# Patient Record
Sex: MALE | Race: WHITE | NOT HISPANIC OR LATINO | Employment: FULL TIME | URBAN - METROPOLITAN AREA
[De-identification: names, ages, dates, MRNs, and addresses within clinical notes are randomized per-mention and may not be internally consistent; named-entity substitution may affect disease eponyms.]

---

## 2017-02-04 ENCOUNTER — APPOINTMENT (OUTPATIENT)
Dept: LAB | Age: 27
End: 2017-02-04
Attending: PREVENTIVE MEDICINE
Payer: COMMERCIAL

## 2017-02-04 ENCOUNTER — OFFICE VISIT (OUTPATIENT)
Dept: LAB | Age: 27
End: 2017-02-04
Attending: PREVENTIVE MEDICINE
Payer: COMMERCIAL

## 2017-02-04 ENCOUNTER — TRANSCRIBE ORDERS (OUTPATIENT)
Dept: ADMINISTRATIVE | Age: 27
End: 2017-02-04

## 2017-02-04 DIAGNOSIS — Z00.00 ROUTINE GENERAL MEDICAL EXAMINATION AT A HEALTH CARE FACILITY: Primary | ICD-10-CM

## 2017-02-04 DIAGNOSIS — Z00.00 ROUTINE GENERAL MEDICAL EXAMINATION AT A HEALTH CARE FACILITY: ICD-10-CM

## 2017-02-04 LAB
ALBUMIN SERPL BCP-MCNC: 4 G/DL (ref 3.5–5)
ALP SERPL-CCNC: 75 U/L (ref 46–116)
ALT SERPL W P-5'-P-CCNC: 21 U/L (ref 12–78)
ANION GAP SERPL CALCULATED.3IONS-SCNC: 5 MMOL/L (ref 4–13)
AST SERPL W P-5'-P-CCNC: 8 U/L (ref 5–45)
BASOPHILS # BLD AUTO: 0.01 THOUSANDS/ΜL (ref 0–0.1)
BASOPHILS NFR BLD AUTO: 0 % (ref 0–1)
BILIRUB SERPL-MCNC: 0.93 MG/DL (ref 0.2–1)
BUN SERPL-MCNC: 13 MG/DL (ref 5–25)
CALCIUM SERPL-MCNC: 9.3 MG/DL (ref 8.3–10.1)
CHLORIDE SERPL-SCNC: 103 MMOL/L (ref 100–108)
CHOLEST SERPL-MCNC: 168 MG/DL (ref 50–200)
CO2 SERPL-SCNC: 31 MMOL/L (ref 21–32)
CREAT SERPL-MCNC: 0.86 MG/DL (ref 0.6–1.3)
EOSINOPHIL # BLD AUTO: 0.06 THOUSAND/ΜL (ref 0–0.61)
EOSINOPHIL NFR BLD AUTO: 1 % (ref 0–6)
ERYTHROCYTE [DISTWIDTH] IN BLOOD BY AUTOMATED COUNT: 12.6 % (ref 11.6–15.1)
GFR SERPL CREATININE-BSD FRML MDRD: >60 ML/MIN/1.73SQ M
GLUCOSE SERPL-MCNC: 94 MG/DL (ref 65–140)
HCT VFR BLD AUTO: 42.2 % (ref 36.5–49.3)
HDLC SERPL-MCNC: 53 MG/DL (ref 40–60)
HGB BLD-MCNC: 14.4 G/DL (ref 12–17)
LDLC SERPL CALC-MCNC: 100 MG/DL (ref 0–100)
LYMPHOCYTES # BLD AUTO: 1.39 THOUSANDS/ΜL (ref 0.6–4.47)
LYMPHOCYTES NFR BLD AUTO: 28 % (ref 14–44)
MCH RBC QN AUTO: 29.9 PG (ref 26.8–34.3)
MCHC RBC AUTO-ENTMCNC: 34.1 G/DL (ref 31.4–37.4)
MCV RBC AUTO: 88 FL (ref 82–98)
MONOCYTES # BLD AUTO: 0.4 THOUSAND/ΜL (ref 0.17–1.22)
MONOCYTES NFR BLD AUTO: 8 % (ref 4–12)
NEUTROPHILS # BLD AUTO: 3.04 THOUSANDS/ΜL (ref 1.85–7.62)
NEUTS SEG NFR BLD AUTO: 63 % (ref 43–75)
NRBC BLD AUTO-RTO: 0 /100 WBCS
PLATELET # BLD AUTO: 239 THOUSANDS/UL (ref 149–390)
PMV BLD AUTO: 10.9 FL (ref 8.9–12.7)
POTASSIUM SERPL-SCNC: 4.7 MMOL/L (ref 3.5–5.3)
PROT SERPL-MCNC: 8.1 G/DL (ref 6.4–8.2)
RBC # BLD AUTO: 4.82 MILLION/UL (ref 3.88–5.62)
SODIUM SERPL-SCNC: 139 MMOL/L (ref 136–145)
TRIGL SERPL-MCNC: 75 MG/DL
WBC # BLD AUTO: 4.91 THOUSAND/UL (ref 4.31–10.16)

## 2017-02-04 PROCEDURE — 36415 COLL VENOUS BLD VENIPUNCTURE: CPT

## 2017-02-04 PROCEDURE — 85025 COMPLETE CBC W/AUTO DIFF WBC: CPT

## 2017-02-04 PROCEDURE — 80061 LIPID PANEL: CPT

## 2017-02-04 PROCEDURE — 80053 COMPREHEN METABOLIC PANEL: CPT

## 2017-02-04 PROCEDURE — 93005 ELECTROCARDIOGRAM TRACING: CPT

## 2017-02-06 LAB
ATRIAL RATE: 47 BPM
P AXIS: 14 DEGREES
PR INTERVAL: 124 MS
QRS AXIS: 47 DEGREES
QRSD INTERVAL: 98 MS
QT INTERVAL: 430 MS
QTC INTERVAL: 380 MS
T WAVE AXIS: 52 DEGREES
VENTRICULAR RATE: 47 BPM

## 2018-01-15 NOTE — MISCELLANEOUS
Message  left message for patient to call for appointment      Active Problems    1  Abdominal pain (789 00) (R10 9)   2  Diarrhea (787 91) (R19 7)   3  Obesity (278 00) (E66 9)   4  Obesity surgery status (V45 86) (Z98 84)   5  Postgastrectomy malabsorption (579 3) (K91 2,Z90 3)   6  Pre-operative cardiovascular examination (V72 81) (Z01 810)    Current Meds   1  Calcium Citrate TABS; 2000 mg daily; Therapy: (Recorded:[id number]) to Recorded   2  [facility] Omeprazole 20 MG Oral Tablet Delayed Release; Take one daily; Therapy: [id number] to (Last KL:[id number])  Requested for: [id number] Ordered   3  Omeprazole 20 MG Oral Tablet Delayed Release; Therapy: (Recorded:30Jan2014) to Recorded   4  One-Daily Multi Vitamins TABS; Therapy: (Recorded:21Oct2013) to Recorded   5  Vitamin D TABS; 4000 iu daily; Therapy: (Recorded:22Jvi6080) to Recorded    Allergies    1   No Known Drug Allergies    Signatures   Electronically signed by : Ananya Edmonds, ; Jun 17 2016  2:30PM EST                       (Author)

## 2019-03-16 ENCOUNTER — APPOINTMENT (OUTPATIENT)
Dept: RADIOLOGY | Facility: MEDICAL CENTER | Age: 29
End: 2019-03-16
Attending: PHYSICIAN ASSISTANT

## 2019-03-16 ENCOUNTER — TRANSCRIBE ORDERS (OUTPATIENT)
Dept: URGENT CARE | Facility: MEDICAL CENTER | Age: 29
End: 2019-03-16
Payer: COMMERCIAL

## 2019-03-16 ENCOUNTER — APPOINTMENT (OUTPATIENT)
Dept: URGENT CARE | Facility: MEDICAL CENTER | Age: 29
End: 2019-03-16
Attending: PHYSICIAN ASSISTANT

## 2019-03-16 DIAGNOSIS — Z02.1 PRE-EMPLOYMENT HEALTH SCREENING EXAMINATION: Primary | ICD-10-CM

## 2019-03-16 DIAGNOSIS — Z02.1 PRE-EMPLOYMENT HEALTH SCREENING EXAMINATION: ICD-10-CM

## 2019-03-16 LAB
ALBUMIN SERPL BCP-MCNC: 4 G/DL (ref 3.5–5)
ALP SERPL-CCNC: 63 U/L (ref 46–116)
ALT SERPL W P-5'-P-CCNC: 23 U/L (ref 12–78)
ANION GAP SERPL CALCULATED.3IONS-SCNC: 4 MMOL/L (ref 4–13)
AST SERPL W P-5'-P-CCNC: 16 U/L (ref 5–45)
ATRIAL RATE: 50 BPM
BASOPHILS # BLD AUTO: 0.02 THOUSANDS/ΜL (ref 0–0.1)
BASOPHILS NFR BLD AUTO: 0 % (ref 0–1)
BILIRUB SERPL-MCNC: 0.82 MG/DL (ref 0.2–1)
BUN SERPL-MCNC: 13 MG/DL (ref 5–25)
CALCIUM SERPL-MCNC: 9 MG/DL (ref 8.3–10.1)
CHLORIDE SERPL-SCNC: 103 MMOL/L (ref 100–108)
CHOLEST SERPL-MCNC: 212 MG/DL (ref 50–200)
CO2 SERPL-SCNC: 29 MMOL/L (ref 21–32)
CREAT SERPL-MCNC: 0.86 MG/DL (ref 0.6–1.3)
EOSINOPHIL # BLD AUTO: 0.04 THOUSAND/ΜL (ref 0–0.61)
EOSINOPHIL NFR BLD AUTO: 1 % (ref 0–6)
ERYTHROCYTE [DISTWIDTH] IN BLOOD BY AUTOMATED COUNT: 12.7 % (ref 11.6–15.1)
GFR SERPL CREATININE-BSD FRML MDRD: 116 ML/MIN/1.73SQ M
GLUCOSE P FAST SERPL-MCNC: 88 MG/DL (ref 65–99)
HCT VFR BLD AUTO: 44.9 % (ref 36.5–49.3)
HDLC SERPL-MCNC: 50 MG/DL (ref 40–60)
HGB BLD-MCNC: 14.8 G/DL (ref 12–17)
IMM GRANULOCYTES # BLD AUTO: 0.01 THOUSAND/UL (ref 0–0.2)
IMM GRANULOCYTES NFR BLD AUTO: 0 % (ref 0–2)
LDLC SERPL CALC-MCNC: 142 MG/DL (ref 0–100)
LYMPHOCYTES # BLD AUTO: 1.53 THOUSANDS/ΜL (ref 0.6–4.47)
LYMPHOCYTES NFR BLD AUTO: 31 % (ref 14–44)
MCH RBC QN AUTO: 30 PG (ref 26.8–34.3)
MCHC RBC AUTO-ENTMCNC: 33 G/DL (ref 31.4–37.4)
MCV RBC AUTO: 91 FL (ref 82–98)
MONOCYTES # BLD AUTO: 0.42 THOUSAND/ΜL (ref 0.17–1.22)
MONOCYTES NFR BLD AUTO: 8 % (ref 4–12)
NEUTROPHILS # BLD AUTO: 3 THOUSANDS/ΜL (ref 1.85–7.62)
NEUTS SEG NFR BLD AUTO: 60 % (ref 43–75)
NONHDLC SERPL-MCNC: 162 MG/DL
NRBC BLD AUTO-RTO: 0 /100 WBCS
P AXIS: 41 DEGREES
PLATELET # BLD AUTO: 242 THOUSANDS/UL (ref 149–390)
PMV BLD AUTO: 10.9 FL (ref 8.9–12.7)
POTASSIUM SERPL-SCNC: 3.9 MMOL/L (ref 3.5–5.3)
PR INTERVAL: 134 MS
PROT SERPL-MCNC: 8 G/DL (ref 6.4–8.2)
QRS AXIS: 43 DEGREES
QRSD INTERVAL: 92 MS
QT INTERVAL: 422 MS
QTC INTERVAL: 384 MS
RBC # BLD AUTO: 4.94 MILLION/UL (ref 3.88–5.62)
SODIUM SERPL-SCNC: 136 MMOL/L (ref 136–145)
T WAVE AXIS: 56 DEGREES
TRIGL SERPL-MCNC: 99 MG/DL
VENTRICULAR RATE: 50 BPM
WBC # BLD AUTO: 5.02 THOUSAND/UL (ref 4.31–10.16)

## 2019-03-16 PROCEDURE — 80061 LIPID PANEL: CPT

## 2019-03-16 PROCEDURE — 80053 COMPREHEN METABOLIC PANEL: CPT

## 2019-03-16 PROCEDURE — 93010 ELECTROCARDIOGRAM REPORT: CPT

## 2019-03-16 PROCEDURE — 85025 COMPLETE CBC W/AUTO DIFF WBC: CPT | Performed by: PHYSICIAN ASSISTANT

## 2019-03-16 PROCEDURE — 71046 X-RAY EXAM CHEST 2 VIEWS: CPT

## 2021-06-29 ENCOUNTER — OFFICE VISIT (OUTPATIENT)
Dept: INTERNAL MEDICINE CLINIC | Facility: CLINIC | Age: 31
End: 2021-06-29
Payer: COMMERCIAL

## 2021-06-29 ENCOUNTER — HOSPITAL ENCOUNTER (OUTPATIENT)
Dept: VASCULAR ULTRASOUND | Facility: HOSPITAL | Age: 31
Discharge: HOME | End: 2021-06-29
Payer: COMMERCIAL

## 2021-06-29 VITALS
SYSTOLIC BLOOD PRESSURE: 110 MMHG | DIASTOLIC BLOOD PRESSURE: 60 MMHG | HEIGHT: 74 IN | WEIGHT: 269 LBS | HEART RATE: 69 BPM | TEMPERATURE: 97.6 F | OXYGEN SATURATION: 99 % | BODY MASS INDEX: 34.52 KG/M2

## 2021-06-29 DIAGNOSIS — M54.31 SCIATICA, RIGHT SIDE: Primary | ICD-10-CM

## 2021-06-29 DIAGNOSIS — M79.651 PAIN IN RIGHT THIGH: ICD-10-CM

## 2021-06-29 PROCEDURE — 99204 OFFICE O/P NEW MOD 45 MIN: CPT | Performed by: INTERNAL MEDICINE

## 2021-06-29 PROCEDURE — 93971 EXTREMITY STUDY: CPT

## 2021-06-29 PROCEDURE — 3725F SCREEN DEPRESSION PERFORMED: CPT | Performed by: INTERNAL MEDICINE

## 2021-06-29 PROCEDURE — 93971 EXTREMITY STUDY: CPT | Performed by: SURGERY

## 2021-06-29 PROCEDURE — 1036F TOBACCO NON-USER: CPT | Performed by: INTERNAL MEDICINE

## 2021-06-29 RX ORDER — DIPHENOXYLATE HYDROCHLORIDE AND ATROPINE SULFATE 2.5; .025 MG/1; MG/1
1 TABLET ORAL DAILY
COMMUNITY

## 2021-06-29 RX ORDER — METHYLPREDNISOLONE 4 MG/1
TABLET ORAL
Qty: 21 EACH | Refills: 0 | Status: SHIPPED | OUTPATIENT
Start: 2021-06-29

## 2021-06-29 RX ORDER — VITAMIN B COMPLEX
TABLET ORAL
COMMUNITY

## 2021-06-29 RX ORDER — CYCLOBENZAPRINE HCL 10 MG
10 TABLET ORAL 3 TIMES DAILY PRN
Qty: 30 TABLET | Refills: 0 | Status: SHIPPED | OUTPATIENT
Start: 2021-06-29

## 2021-06-29 NOTE — ASSESSMENT & PLAN NOTE
Will start oral steroids to take down inflammation and muscle relaxant  Also refer patient to physical therapy  If patient not improving, will consider imaging

## 2021-06-29 NOTE — PROGRESS NOTES
Assessment/Plan:    Sciatica, right side   Will start oral steroids to take down inflammation and muscle relaxant  Also refer patient to physical therapy  If patient not improving, will consider imaging  Diagnoses and all orders for this visit:    Sciatica, right side  -     Ambulatory referral to Physical Therapy; Future  -     methylPREDNISolone 4 MG tablet therapy pack; Use as directed on package  -     cyclobenzaprine (FLEXERIL) 10 mg tablet; Take 1 tablet (10 mg total) by mouth 3 (three) times a day as needed for muscle spasms    Other orders  -     multivitamin (THERAGRAN) TABS; Take 1 tablet by mouth daily  -     OMEPRAZOLE PO; Take by mouth  -     cholecalciferol (VITAMIN D3) 25 mcg (1,000 units) tablet; Take by mouth        BMI Counseling: Body mass index is 34 54 kg/m²  The BMI is above normal  Nutrition recommendations include encouraging healthy choices of fruits and vegetables and moderation in carbohydrate intake  Exercise recommendations include exercising 3-5 times per week  Subjective:      Patient ID: Gena Leal is a 26 y o  male  Pt started with a muscle cramp in the back of his leg on the right  Pain starts in buttock and radiates down to below knee  No Injury in the area, no rash, no fevers, no chills, no leakage of bladder or bowel, no change in bowels, no saddle anesthesia  No history of this pain  Pain is worse with movement, 4/10 at rest, and 8 or 9/10 with movement  Pt has been using tylenol      The following portions of the patient's history were reviewed and updated as appropriate: allergies, current medications, past family history, past medical history, past social history, past surgical history and problem list     Review of Systems   Constitutional: Negative for chills, fatigue and fever  HENT: Negative for congestion, nosebleeds, postnasal drip, sore throat and trouble swallowing  Eyes: Negative for pain     Respiratory: Negative for cough, chest tightness, shortness of breath and wheezing  Cardiovascular: Negative for chest pain, palpitations and leg swelling  Gastrointestinal: Negative for abdominal pain, constipation, diarrhea, nausea and vomiting  Endocrine: Negative for polydipsia and polyuria  Genitourinary: Negative for dysuria, flank pain and hematuria  Musculoskeletal: Positive for back pain  Negative for arthralgias  Skin: Negative for rash  Neurological: Negative for dizziness, tremors and headaches  Hematological: Does not bruise/bleed easily  Psychiatric/Behavioral: Negative for confusion and dysphoric mood  The patient is not nervous/anxious  Objective:      /60   Pulse 69   Temp 97 6 °F (36 4 °C) (Temporal)   Ht 6' 2" (1 88 m)   Wt 122 kg (269 lb)   SpO2 99%   BMI 34 54 kg/m²          Physical Exam  Vitals reviewed  Constitutional:       General: He is not in acute distress  Appearance: Normal appearance  He is well-developed  HENT:      Head: Normocephalic and atraumatic  Right Ear: External ear normal       Left Ear: External ear normal    Eyes:      General: No scleral icterus  Conjunctiva/sclera: Conjunctivae normal    Neck:      Thyroid: No thyromegaly  Trachea: No tracheal deviation  Cardiovascular:      Rate and Rhythm: Normal rate and regular rhythm  Heart sounds: Normal heart sounds  Pulmonary:      Effort: Pulmonary effort is normal  No respiratory distress  Breath sounds: Normal breath sounds  No wheezing or rales  Abdominal:      General: Bowel sounds are normal       Palpations: Abdomen is soft  Tenderness: There is no abdominal tenderness  There is no guarding or rebound  Musculoskeletal:      Cervical back: Normal range of motion and neck supple  Right lower leg: No edema  Left lower leg: No edema        Comments:  Patient unable to do straight leg raise test on right due to pain   Lymphadenopathy:      Cervical: No cervical adenopathy  Skin:     Coloration: Skin is not jaundiced or pale  Neurological:      Mental Status: He is alert and oriented to person, place, and time  Psychiatric:         Behavior: Behavior normal          Thought Content:  Thought content normal          Judgment: Judgment normal

## 2021-06-29 NOTE — PATIENT INSTRUCTIONS
Problem List Items Addressed This Visit        Nervous and Auditory    Sciatica, right side - Primary      Will start oral steroids to take down inflammation and muscle relaxant  Also refer patient to physical therapy  If patient not improving, will consider imaging           Relevant Medications    methylPREDNISolone 4 MG tablet therapy pack    cyclobenzaprine (FLEXERIL) 10 mg tablet    Other Relevant Orders    Ambulatory referral to Physical Therapy

## 2021-07-01 ENCOUNTER — EVALUATION (OUTPATIENT)
Dept: PHYSICAL THERAPY | Facility: MEDICAL CENTER | Age: 31
End: 2021-07-01
Payer: COMMERCIAL

## 2021-07-01 DIAGNOSIS — M54.31 SCIATICA, RIGHT SIDE: Primary | ICD-10-CM

## 2021-07-01 PROCEDURE — 97161 PT EVAL LOW COMPLEX 20 MIN: CPT

## 2021-07-01 NOTE — PROGRESS NOTES
PT Evaluation     Today's date: 2021  Patient name: Favian Johnson  : 1990  MRN: [id number]  Referring provider: Tari Villalobos MD  Dx:   Encounter Diagnosis     ICD-10-CM    1  Sciatica, right side  M54 31 Ambulatory referral to Physical Therapy       Start Time: 81  Stop Time: 3884  Total time in clinic (min): 40 minutes    Assessment  Assessment details: Vin Montalvo is a 25 y/o male who presents to OP PT with a a referral from Dr Tahmina Aguilar with a medical diagnosis of sciatica, right side Upon examination pt presents with decreased lumbar ROM, decreased B LE strength, decreased functional mobility, decreased muscular endurance, and increased pain  Previously mentioned deficits have impaired patients ability to perform ADLs, recreational activities and house hold duties  Pt was educated on examination findings, diagnosis, prognosis, home exercise program to complete  Pt states understanding and consents to skilled PT services  Pt is a good candidate for skilled PT services  Positive prognositic indicators include desire to improve, active lifestyle and directional biased low back pain     Negative prognostic indicators include chronicity of issue  Pt would benefit from skilled PT services to address functional deficits to return to PLOF  Impairments: abnormal gait, abnormal muscle firing, abnormal muscle tone, abnormal or restricted ROM, abnormal movement, activity intolerance, impaired physical strength, lacks appropriate home exercise program and pain with function    Symptom irritability: moderateUnderstanding of Dx/Px/POC: good   Prognosis: good    Goals  STG    1  Patient will demonstrate increased hip abduction strength to 4+/5 to improve gait mechanics in midstance and pre-swing stages    2  Patient will be able to perform deep squat and return to standing without pain to fulfill requirements as     3      Pt will report a 2-3 point decrease on NPRS for improved tolerance to ADLs, recreational activities and work duties         LTG    1  Patient will be independent with HEP for continued progress  2      Patient will demonstrate 5/5 in all hip  MMT to improve tolerance for performing work and recreational activities and allow for return to prolonged community ambulation    3  Patient will attain FOTO score of 73 or greater at time of discharge  4      Patient will tolerate driving greater than 3 hours for improved tolerance to work duties  5     Patient will demonstrate minimal lumbar ROM restriction  6   Pt will return to PLOF in all recreational activities, work duties, and ADLs  Plan  Patient would benefit from: skilled physical therapy  Referral necessary: No  Planned modality interventions: cryotherapy and TENS  Planned therapy interventions: joint mobilization, manual therapy, massage, ADL training, balance, neuromuscular re-education, patient education, strengthening, stretching, therapeutic activities, therapeutic exercise, flexibility, functional ROM exercises, gait training, graded exercise and home exercise program  Frequency: 2x week  Plan of Care beginning date: 7/1/2021  Plan of Care expiration date: 8/26/2021  Treatment plan discussed with: patient        Subjective Evaluation    History of Present Illness  Mechanism of injury: Subjective: Patient states he bent over to while working on his tractor and felt a sharp pain  He states initially behind his knee and since this, five days have passed, he states he continues to have sharp stabbing pain radiating from his knee to his butt  Patient states he is a side sleeper normally, and has not been able to sleep  Patient states he is a  and has not been able to drive because of the pain  Patient states he has been given an oral steroid and muscle realxant that have improved his symptoms     Pain  Location: low back  Type: stabbing/shooting  Current: 6/10  Best: 5/10  Worst: 9/10  Alleviates: medication-slightly  Aggravates: all movement    Occupation: truck drive    Imaging:     PMH: n/a    Previous Surgeries: gastric bypass    Previous Physical Activity: 30 minutes of activity per day    Current Medications: n/a    RAEANN: flexion  Surgery Date: n/a     MD: Dr Tamar Sam    Patient Goals: be out of pain, return to work               Objective     Palpation   Left   Hypertonic in the erector spinae, lumbar paraspinals and quadratus lumborum  Muscle spasm in the erector spinae  Right   Hypertonic in the erector spinae, lumbar paraspinals and quadratus lumborum  Tenderness     Left Hip   Tenderness in the PSIS  Right Hip   Tenderness in the PSIS  Active Range of Motion     Lumbar   Flexion:  with pain Restriction level: maximal  Extension:  Restriction level: minimal  Left lateral flexion:  Restriction level: maximal  Right lateral flexion:  Restriction level: maximal  Left rotation:  Restriction level: maximal  Right rotation:  Restriction level: maximal    Joint Play     Hypomobile: L1, L2, L3, L4, L5 and S1     Pain: L1, L2, L3, L4, L5 and S1   Mechanical Assessment    Cervical      Thoracic      Lumbar    Standing extension: repeated movements  Pain intensity: worse  Pain level: produced  Lying extension: sustained positions  Pain location: centralized  Pain intensity: better  Pain level: decreased    Strength/Myotome Testing     Left Hip   Planes of Motion   Flexion: 2+  Extension: 2+  Abduction: 2+  External rotation: 2+  Internal rotation: 2+    Right Hip   Planes of Motion   Flexion: 4+  Extension: 4+  Abduction: 4+  External rotation: 3+  Internal rotation: 3+    Left Knee   Flexion: 3-  Extension: 3-    Right Knee   Flexion: 5  Extension: 5    Functional Assessment      Squat    Trunk lean left and sitting toward left side         Flowsheet Rows      Most Recent Value   PT/OT G-Codes   Current Score  33   Projected Score  73             Precautions: universal      Manuals 7/1            Jt Mob-Pa mob             STM                                       Neuro Re-Ed             TAB             TAB ADD             TAB ER                                                                 Ther Ex             Supine HSS             Prone lying  3' 3x            Prone Prop unable            Prone Press Up                                                                 Ther Activity                                       Gait Training                                       Modalities

## 2021-07-06 ENCOUNTER — OFFICE VISIT (OUTPATIENT)
Dept: PHYSICAL THERAPY | Facility: MEDICAL CENTER | Age: 31
End: 2021-07-06
Payer: COMMERCIAL

## 2021-07-06 DIAGNOSIS — M54.31 SCIATICA, RIGHT SIDE: Primary | ICD-10-CM

## 2021-07-06 PROCEDURE — 97110 THERAPEUTIC EXERCISES: CPT

## 2021-07-06 PROCEDURE — 97112 NEUROMUSCULAR REEDUCATION: CPT

## 2021-07-06 PROCEDURE — 97140 MANUAL THERAPY 1/> REGIONS: CPT

## 2021-07-06 NOTE — PROGRESS NOTES
Daily Note     Today's date: 2021  Patient name: Harrison Griffith  : 1990  MRN: [id number]  Referring provider: Sariah Bhatia MD  Dx:   Encounter Diagnosis     ICD-10-CM    1  Sciatica, right side  M54 31        Start Time: 688  Stop Time:   Total time in clinic (min): 38 minutes    Subjective: Pt currently rates pain as a 5/10  He states he finished his steroid over the weekend  He has noticed an improvement in symptoms with prone lying  Currently pain is below his Right glute  Objective: See treatment diary below      Assessment: Tolerated treatment well  Post prone press ups, patient states pain has moved to just above right glute  Patient with palpable trigger points during soft tissue mobilization, with ischemic compression, moderate resolution  Patient denies radicular symptoms at end of session  Rates low back pain overall as a 4/10  Patient demonstrated fatigue post treatment and would benefit from continued PT      Plan: Continue per plan of care  Progress treatment as tolerated         Precautions: universal      Manuals            Jt Mob-Pa mob             STM  L/S R,                                       Neuro Re-Ed             TAB  5" 20x           TAB ADD  5" 15x           TAB ER  5" 20x BTB                                                               Ther Ex             Supine HSS             Prone lying  3' 3x            Prone Prop unable 3'            Prone Press Up  20x 5"           Prone Hip ext  10x           LTR  5" 10xea                                     Ther Activity                                       Gait Training                                       Modalities

## 2021-07-08 ENCOUNTER — OFFICE VISIT (OUTPATIENT)
Dept: PHYSICAL THERAPY | Facility: MEDICAL CENTER | Age: 31
End: 2021-07-08
Payer: COMMERCIAL

## 2021-07-08 DIAGNOSIS — M54.31 SCIATICA, RIGHT SIDE: Primary | ICD-10-CM

## 2021-07-08 PROCEDURE — 97140 MANUAL THERAPY 1/> REGIONS: CPT

## 2021-07-08 PROCEDURE — 97110 THERAPEUTIC EXERCISES: CPT

## 2021-07-08 NOTE — PROGRESS NOTES
Daily Note     Today's date: 2021  Patient name: Harrison Griffith  : 1990  MRN: [id number]  Referring provider: Sariah Bhatia MD  Dx:   Encounter Diagnosis     ICD-10-CM    1  Sciatica, right side  M54 31                   Subjective: Patient rates pain as a 6/10, and currently radicular symptoms are "mid glute " He states he feels stiff in the morning but notices slight improvement when moving around at work  Objective: See treatment diary below      Assessment: Tolerated treatment well  Post press up with over pressure patient rates pain as a 3/10 in the low back as well as his glute  He does deny further centralization  Patient educated to continue HEP at this time  Pt states understanding and agrees  Patient demonstrated fatigue post treatment and would benefit from continued PT      Plan: Continue per plan of care  Progress treatment as tolerated         Precautions: universal    PT 1:1 [id number]  Manuals           Jt Mob-Pa mob             STM  L/S R,   L/S R                                    Neuro Re-Ed             TAB  5" 20x 5" 20x          TAB ADD  5" 15x 5" 15x          TAB ER  5" 20x BTB 5" 20x BTB                                                              Ther Ex             Supine HSS             Prone lying  3' 3x            Prone Prop unable 3'            Prone Press Up  20x 5" 20x 5" w/ OP          Prone Hip ext  10x           LTR  5" 10xea 5" 10xea          Prone on elbows   3'          Supine HSS   30" 3x          Piriformis Stretch   30" 3x                       Ther Activity                                       Gait Training                                       Modalities

## 2021-07-13 ENCOUNTER — OFFICE VISIT (OUTPATIENT)
Dept: PHYSICAL THERAPY | Facility: MEDICAL CENTER | Age: 31
End: 2021-07-13
Payer: COMMERCIAL

## 2021-07-13 DIAGNOSIS — M54.31 SCIATICA, RIGHT SIDE: Primary | ICD-10-CM

## 2021-07-13 PROCEDURE — 97110 THERAPEUTIC EXERCISES: CPT

## 2021-07-13 PROCEDURE — 97140 MANUAL THERAPY 1/> REGIONS: CPT

## 2021-07-13 NOTE — PROGRESS NOTES
Daily Note     Today's date: 2021  Patient name: Ike Maravilla  : 1990  MRN: [id number]  Referring provider: Poonam Louis MD  Dx:   Encounter Diagnosis     ICD-10-CM    1  Sciatica, right side  M54 31                   Subjective: Pt states pain at worst is a 5/10  He states radicular symptoms remain "mid glute "      Objective: See treatment diary below      Assessment: Tolerated treatment well  Patient gains relief of symptoms this session from prone   Patient educated to increase frequency of HEP, states understanding  Patient demonstrated fatigue post treatment and would benefit from continued PT      Plan: Continue per plan of care  Progress treatment as tolerated         Precautions: universal      Manuals          Jt Mob-Pa mob             STM  L/S R,   L/S R L/S R                                   Neuro Re-Ed             TAB  5" 20x 5" 20x 5" 20x         TAB ADD  5" 15x 5" 15x 5" 15x         TAB ER  5" 20x BTB 5" 20x BTB 5" 20x BTB                                                             Ther Ex             Supine HSS             Prone lying  3' 3x            Prone Prop unable 3'            Prone Press Up  20x 5" 20x 5" w/ OP 20x 5" w/ OP         Prone Hip ext  10x           LTR  5" 10xea 5" 10xea 5" 10xea         Prone on elbows   3' 2'         Supine HSS   30" 3x 30" 3x         Piriformis Stretch   30" 3x 30" 3x                      Ther Activity                                       Gait Training                                       Modalities

## 2021-07-15 ENCOUNTER — APPOINTMENT (OUTPATIENT)
Dept: PHYSICAL THERAPY | Facility: MEDICAL CENTER | Age: 31
End: 2021-07-15
Payer: COMMERCIAL

## 2021-07-20 ENCOUNTER — OFFICE VISIT (OUTPATIENT)
Dept: PHYSICAL THERAPY | Facility: MEDICAL CENTER | Age: 31
End: 2021-07-20
Payer: COMMERCIAL

## 2021-07-20 DIAGNOSIS — M54.31 SCIATICA, RIGHT SIDE: Primary | ICD-10-CM

## 2021-07-20 PROCEDURE — 97140 MANUAL THERAPY 1/> REGIONS: CPT

## 2021-07-20 PROCEDURE — 97110 THERAPEUTIC EXERCISES: CPT

## 2021-07-20 PROCEDURE — 97112 NEUROMUSCULAR REEDUCATION: CPT

## 2021-07-20 NOTE — PROGRESS NOTES
Daily Note     Today's date: 2021  Patient name: Guy Burgess  : 1990  MRN: [id number]  Referring provider: Maura Ghosh MD  Dx:   Encounter Diagnosis     ICD-10-CM    1  Sciatica, right side  M54 31        Start Time:   Stop Time:   Total time in clinic (min): 40 minutes    Subjective: Patient states significant overall improvement in symptoms  He states currently pain in low back is a 4/10  Denies radicular symptoms  Objective: See treatment diary below      Assessment: Tolerated treatment well  Pain remains the same throughout session for patient, however denies increase in pain with new movement patterns  Patient demonstrated fatigue post treatment and would benefit from continued PT      Plan: Continue per plan of care  Progress treatment as tolerated         Precautions: universal    PT 1:1 -  Manuals         Jt Mob-Pa mob             STM  L/S R,   L/S R L/S R L/S R                                  Neuro Re-Ed             TAB  5" 20x 5" 20x 5" 20x         TAB ADD  5" 15x 5" 15x 5" 15x 5" 15x        TAB ER  5" 20x BTB 5" 20x BTB 5" 20x BTB 5" 20x BTB        Superman Progression     Unilateral progression 5x                                               Ther Ex             Supine HSS             Prone lying  3' 3x            Prone Prop unable 3'            Prone Press Up  20x 5" 20x 5" w/ OP 20x 5" w/ OP 20x        Prone Hip ext  10x   20x        LTR  5" 10xea 5" 10xea 5" 10xea HEP        Prone on elbows   3' 2' HEP        Supine HSS   30" 3x 30" 3x 30" 3x        Piriformis Stretch   30" 3x 30" 3x 30" 3x        Standing Ext w/ OP     20x        Ther Activity                                       Gait Training                                       Modalities

## 2021-07-22 ENCOUNTER — OFFICE VISIT (OUTPATIENT)
Dept: PHYSICAL THERAPY | Facility: MEDICAL CENTER | Age: 31
End: 2021-07-22
Payer: COMMERCIAL

## 2021-07-22 DIAGNOSIS — M54.31 SCIATICA, RIGHT SIDE: Primary | ICD-10-CM

## 2021-07-22 PROCEDURE — 97140 MANUAL THERAPY 1/> REGIONS: CPT

## 2021-07-22 PROCEDURE — 97112 NEUROMUSCULAR REEDUCATION: CPT

## 2021-07-22 PROCEDURE — 97110 THERAPEUTIC EXERCISES: CPT

## 2021-07-22 NOTE — PROGRESS NOTES
Daily Note     Today's date: 2021  Patient name: Harrison Griffith  : 1990  MRN: [id number]  Referring provider: Sariah Bhatia MD  Dx:   Encounter Diagnosis     ICD-10-CM    1  Sciatica, right side  M54 31        Start Time: 7474  Stop Time: 32  Total time in clinic (min): 39 minutes    Subjective: Patient states low back pain remains the same  Rating pain as a 4/10  He states he did experience some tightness the following day after previous visit  Objective: See treatment diary below      Assessment: Tolerated treatment well  Birddog/superman progression held this session due to reported symptoms  Patient was educated in use of tennis ball for self STM for address continued restrictions in lumbar spine  Patient demonstrated fatigue post treatment and would benefit from continued PT      Plan: Continue per plan of care  Progress treatment as tolerated         Precautions: universal      Manuals        Jt Mob-Pa mob             STM  L/S R,   L/S R L/S R L/S R L/S R-pt educated for tennis ball STM of                                  Neuro Re-Ed             TAB  5" 20x 5" 20x 5" 20x         TAB ADD  5" 15x 5" 15x 5" 15x 5" 15x        TAB ER  5" 20x BTB 5" 20x BTB 5" 20x BTB 5" 20x BTB 5" 20x BTB       Superman Progression     Unilateral progression 5x hold       Clamshell      20x                                 Ther Ex             Supine HSS             Prone lying  3' 3x            Prone Prop unable 3'            Prone Press Up  20x 5" 20x 5" w/ OP 20x 5" w/ OP 20x 20x       Prone Hip ext  10x   20x 20x       LTR  5" 10xea 5" 10xea 5" 10xea HEP        Prone on elbows   3' 2' HEP        Supine HSS   30" 3x 30" 3x 30" 3x 30" 3x       Piriformis Stretch   30" 3x 30" 3x 30" 3x 30" 3x       Standing Ext w/ OP     20x 20x       Ther Activity                                       Gait Training                                       Modalities

## 2021-07-27 ENCOUNTER — OFFICE VISIT (OUTPATIENT)
Dept: PHYSICAL THERAPY | Facility: MEDICAL CENTER | Age: 31
End: 2021-07-27
Payer: COMMERCIAL

## 2021-07-27 DIAGNOSIS — M54.31 SCIATICA, RIGHT SIDE: Primary | ICD-10-CM

## 2021-07-27 PROCEDURE — 97110 THERAPEUTIC EXERCISES: CPT

## 2021-07-27 PROCEDURE — 97140 MANUAL THERAPY 1/> REGIONS: CPT

## 2021-07-27 PROCEDURE — 97112 NEUROMUSCULAR REEDUCATION: CPT

## 2021-07-29 ENCOUNTER — OFFICE VISIT (OUTPATIENT)
Dept: PHYSICAL THERAPY | Facility: MEDICAL CENTER | Age: 31
End: 2021-07-29
Payer: COMMERCIAL

## 2021-07-29 DIAGNOSIS — M54.31 SCIATICA, RIGHT SIDE: Primary | ICD-10-CM

## 2021-07-29 PROCEDURE — 97140 MANUAL THERAPY 1/> REGIONS: CPT

## 2021-07-29 PROCEDURE — 97110 THERAPEUTIC EXERCISES: CPT

## 2021-07-29 PROCEDURE — 97112 NEUROMUSCULAR REEDUCATION: CPT

## 2021-07-29 NOTE — PROGRESS NOTES
Daily Note     Today's date: 2021  Patient name: Harrison Griffith  : 1990  MRN: [id number]  Referring provider: Sariah Bhatia MD  Dx:   Encounter Diagnosis     ICD-10-CM    1  Sciatica, right side  M54 31        Start Time: 794  Stop Time:   Total time in clinic (min): 39 minutes    Subjective: Patient states pain is currently a 3/10, but states pain has not increased at all since previous visit  Objective: See treatment diary below      Assessment: Tolerated treatment well  Patient with rating of 2/10 post STM this session  Patient exhibits good biomechanics in performance of box lift today  Patient demonstrated fatigue post treatment and would benefit from continued PT      Plan: Continue per plan of care  Progress treatment as tolerated         Precautions: universal      Manuals      Jt Mob-Pa mob             STM  L/S R,   L/S R L/S R L/S R L/S R-pt educated for tennis ball STM of  L/S R L/S R                               Neuro Re-Ed             TAB  5" 20x 5" 20x 5" 20x         TAB ADD  5" 15x 5" 15x 5" 15x 5" 15x        TAB ER  5" 20x BTB 5" 20x BTB 5" 20x BTB 5" 20x BTB 5" 20x BTB 5" 20x BTB      Superman Progression     Unilateral progression 5x hold       Clamshell      20x 20x 20x     Palloff Press       BTB 20x ea BTB 20x ea     TAB Sled Push       NV Sled + 35# 20' 5x     TAB box Lift       NV 45# 20x     Ther Ex             Prone lying  3' 3x            Prone Prop unable 3'            Prone Press Up  20x 5" 20x 5" w/ OP 20x 5" w/ OP 20x 20x 20x 20x     Prone Hip ext  10x   20x 20x 20x 20x     LTR  5" 10xea 5" 10xea 5" 10xea HEP        Prone on elbows   3' 2' HEP        Supine HSS   30" 3x 30" 3x 30" 3x 30" 3x 30" 3x 30" 3x     Piriformis Stretch   30" 3x 30" 3x 30" 3x 30" 3x 30" 3x 30" 3x     Standing Ext w/ OP     20x 20x 20x 20x     Ther Activity                                       Gait Training Modalities

## 2021-08-03 ENCOUNTER — OFFICE VISIT (OUTPATIENT)
Dept: PHYSICAL THERAPY | Facility: MEDICAL CENTER | Age: 31
End: 2021-08-03
Payer: COMMERCIAL

## 2021-08-03 DIAGNOSIS — M54.31 SCIATICA, RIGHT SIDE: Primary | ICD-10-CM

## 2021-08-03 PROCEDURE — 97140 MANUAL THERAPY 1/> REGIONS: CPT

## 2021-08-03 PROCEDURE — 97112 NEUROMUSCULAR REEDUCATION: CPT

## 2021-08-03 NOTE — PROGRESS NOTES
Daily Note     Today's date: 8/3/2021  Patient name: Dyana Larsen  : 1990  MRN: [id number]  Referring provider: Hortencia Hawkins MD  Dx:   Encounter Diagnosis     ICD-10-CM    1  Sciatica, right side  M54 31                   Subjective: Pt states currently rates pain as a 2/10  He states when experiencing an increase in symptoms he uses a tennis ball as educated for symptom management  Objective: See treatment diary below      Assessment: Tolerated treatment well  Patient demonstrated fatigue post treatment and would benefit from continued PT      Plan: Continue per plan of care  Progress treatment as tolerated         Precautions: universal    PT 1:1 6295-4402  Manuals 7/1 7/6 7/8 7/13 7/20 7/22 7/27 7/29 8/3    Jt Mob-Pa mob             STM  L/S R,   L/S R L/S R L/S R L/S R-pt educated for tennis ball STM of  L/S R L/S R L/S R                              Neuro Re-Ed             TAB  5" 20x 5" 20x 5" 20x         TAB ADD  5" 15x 5" 15x 5" 15x 5" 15x        TAB ER  5" 20x BTB 5" 20x BTB 5" 20x BTB 5" 20x BTB 5" 20x BTB 5" 20x BTB      Superman Progression     Unilateral progression 5x hold       Clamshell      20x 20x 20x 20x    Palloff Press       BTB 20x ea BTB 20x ea BLK 20x ea    TAB Sled Push       NV Sled + 35# 20' 5x Sled + 50# 20' 5x    TAB [place] Carry         25# bilaterally 20' 5x    TAB box Lift       NV 45# 20x 55# 20x    Ther Ex             Prone lying  3' 3x            Prone Prop unable 3'            Prone Press Up  20x 5" 20x 5" w/ OP 20x 5" w/ OP 20x 20x 20x 20x 20x    Prone Hip ext  10x   20x 20x 20x 20x 20x    LTR  5" 10xea 5" 10xea 5" 10xea HEP        Prone on elbows   3' 2' HEP        Supine HSS   30" 3x 30" 3x 30" 3x 30" 3x 30" 3x 30" 3x 30" 3x    Piriformis Stretch   30" 3x 30" 3x 30" 3x 30" 3x 30" 3x 30" 3x 30" 3x    Standing Ext w/ OP     20x 20x 20x 20x 20x    Ther Activity                                       Gait Training Modalities

## 2021-08-05 ENCOUNTER — OFFICE VISIT (OUTPATIENT)
Dept: PHYSICAL THERAPY | Facility: MEDICAL CENTER | Age: 31
End: 2021-08-05
Payer: COMMERCIAL

## 2021-08-05 DIAGNOSIS — M54.31 SCIATICA, RIGHT SIDE: Primary | ICD-10-CM

## 2021-08-05 PROCEDURE — 97140 MANUAL THERAPY 1/> REGIONS: CPT

## 2021-08-05 PROCEDURE — 97112 NEUROMUSCULAR REEDUCATION: CPT

## 2021-08-05 NOTE — PROGRESS NOTES
Daily Note     Today's date: 2021  Patient name: Mynor Busch  : 1990  MRN: [id number]  Referring provider: Dung Mcmahon MD  Dx:   Encounter Diagnosis     ICD-10-CM    1  Sciatica, right side  M54 31                   Subjective: Pt states pain remains a 2/10  Objective: See treatment diary below      Assessment: Tolerated treatment well  Patient demonstrated fatigue post treatment, exhibited good technique with therapeutic exercises and would benefit from continued PT      Plan: Continue per plan of care  Progress note during next visit  Potential discharge next visit       Precautions: universal    PT 1:1 [id number]  Manuals 7/1 7/6 7/8 7/13 7/20 7/22 7/27 7/29 8/3 8/5   Jt Mob-Pa mob             STM  L/S R,   L/S R L/S R L/S R L/S R-pt educated for tennis ball STM of  L/S R L/S R L/S R L/S R                             Neuro Re-Ed             TAB  5" 20x 5" 20x 5" 20x         TAB ADD  5" 15x 5" 15x 5" 15x 5" 15x        TAB ER  5" 20x BTB 5" 20x BTB 5" 20x BTB 5" 20x BTB 5" 20x BTB 5" 20x BTB      Superman Progression     Unilateral progression 5x hold       Clamshell      20x 20x 20x 20x 20x   Palloff Press       BTB 20x ea BTB 20x ea BLK 20x ea BLK 20x ea   TAB Sled Push       NV Sled + 35# 20' 5x Sled + 50# 20' 5x Sled + 50# 20' 5x   TAB Farmer Carry         25# bilaterally 20' 5x 25# bilaterally 20' 5x   TAB box Lift       NV 45# 20x 55# 20x 55# 20x   Ther Ex             Prone lying  3' 3x            Prone Prop unable 3'            Prone Press Up  20x 5" 20x 5" w/ OP 20x 5" w/ OP 20x 20x 20x 20x 20x 20x   Prone Hip ext  10x   20x 20x 20x 20x 20x 20x   LTR  5" 10xea 5" 10xea 5" 10xea HEP        Prone on elbows   3' 2' HEP        Supine HSS   30" 3x 30" 3x 30" 3x 30" 3x 30" 3x 30" 3x 30" 3x 30" 3x   Piriformis Stretch   30" 3x 30" 3x 30" 3x 30" 3x 30" 3x 30" 3x 30" 3x 30" 3x   Standing Ext w/ OP     20x 20x 20x 20x 20x 20x   Ther Activity                                       Gait Training                                       Modalities

## 2021-08-06 ENCOUNTER — TELEPHONE (OUTPATIENT)
Dept: INTERNAL MEDICINE CLINIC | Facility: CLINIC | Age: 31
End: 2021-08-06

## 2021-08-06 NOTE — TELEPHONE ENCOUNTER
Pt's wife called to ask if you can write a letter stating the [facility] has been going to physical therapy and can now return to work

## 2021-08-10 ENCOUNTER — APPOINTMENT (OUTPATIENT)
Dept: PHYSICAL THERAPY | Facility: MEDICAL CENTER | Age: 31
End: 2021-08-10
Payer: COMMERCIAL

## 2021-08-12 ENCOUNTER — OFFICE VISIT (OUTPATIENT)
Dept: PHYSICAL THERAPY | Facility: MEDICAL CENTER | Age: 31
End: 2021-08-12
Payer: COMMERCIAL

## 2021-08-12 DIAGNOSIS — M54.31 SCIATICA, RIGHT SIDE: Primary | ICD-10-CM

## 2021-08-12 PROCEDURE — 97112 NEUROMUSCULAR REEDUCATION: CPT

## 2021-08-12 PROCEDURE — 97140 MANUAL THERAPY 1/> REGIONS: CPT

## 2021-08-12 NOTE — PROGRESS NOTES
Daily Note     Today's date: 2021  Patient name: Merline Sans  : 1990  MRN: [id number]  Referring provider: Teofilo Nunez MD  Dx:   Encounter Diagnosis     ICD-10-CM    1  Sciatica, right side  M54 31        Start Time:   Stop Time:   Total time in clinic (min): 40 minutes    Subjective: Pt states 1 5 pain at this time  And states he is comfortable making today his last day  Objective: See treatment diary below      Assessment: Tolerated treatment well  Patient demonstrated fatigue post treatment Patient appropriate for skilled PT discharge at this time  Plan: Discharge at this time        Precautions: universal    PT 1:1 [id number]  Manuals 2021   7/6 7/8 7/13 7/20 7/22 7/27 7/29 8/3 8/5   Jt Mob-Pa mob             STM L/S R L/S R,   L/S R L/S R L/S R L/S R-pt educated for tennis ball STM of  L/S R L/S R L/S R L/S R                             Neuro Re-Ed             TAB  5" 20x 5" 20x 5" 20x         TAB ADD 5" 15x 5" 15x 5" 15x 5" 15x 5" 15x        TAB ER 5" 20x BTB 5" 20x BTB 5" 20x BTB 5" 20x BTB 5" 20x BTB 5" 20x BTB 5" 20x BTB      Superman Progression     Unilateral progression 5x hold       Clamshell 20x     20x 20x 20x 20x 20x   Palloff Press BLK 20x ea      BTB 20x ea BTB 20x ea BLK 20x ea BLK 20x ea   TAB Sled Push Sled + 50# 20' 5x      NV Sled + 35# 20' 5x Sled + 50# 20' 5x Sled + 50# 20' 5x   TAB [place] 25# bilaterally 20' 5x        25# bilaterally 20' 5x 25# bilaterally 20' 5x   TAB box Lift 55# 20x      NV 45# 20x 55# 20x 55# 20x   Ther Ex             Prone lying  3' 3x            Prone Prop unable 3'            Prone Press Up  20x 5" 20x 5" w/ OP 20x 5" w/ OP 20x 20x 20x 20x 20x 20x   Prone Hip ext  10x   20x 20x 20x 20x 20x 20x   LTR  5" 10xea 5" 10xea 5" 10xea HEP        Prone on elbows   3' 2' HEP        Supine HSS 30" 3x  30" 3x 30" 3x 30" 3x 30" 3x 30" 3x 30" 3x 30" 3x 30" 3x   Piriformis Stretch 30" 3x  30" 3x 30" 3x 30" 3x 30" 3x 30" 3x 30" 3x 30" 3x 30" 3x   Standing Ext w/ OP 20x    20x 20x 20x 20x 20x 20x   Ther Activity                                       Gait Training                                       Modalities

## 2021-08-17 ENCOUNTER — APPOINTMENT (OUTPATIENT)
Dept: PHYSICAL THERAPY | Facility: MEDICAL CENTER | Age: 31
End: 2021-08-17
Payer: COMMERCIAL

## 2021-08-19 ENCOUNTER — APPOINTMENT (OUTPATIENT)
Dept: PHYSICAL THERAPY | Facility: MEDICAL CENTER | Age: 31
End: 2021-08-19
Payer: COMMERCIAL